# Patient Record
Sex: FEMALE | Race: WHITE | Employment: OTHER | URBAN - METROPOLITAN AREA
[De-identification: names, ages, dates, MRNs, and addresses within clinical notes are randomized per-mention and may not be internally consistent; named-entity substitution may affect disease eponyms.]

---

## 2017-03-09 RX ORDER — SIMVASTATIN 20 MG/1
TABLET, FILM COATED ORAL
Qty: 90 TAB | Refills: 2 | Status: SHIPPED | OUTPATIENT
Start: 2017-03-09 | End: 2017-03-21 | Stop reason: SDUPTHER

## 2017-03-09 RX ORDER — AMLODIPINE BESYLATE 5 MG/1
TABLET ORAL
Qty: 90 TAB | Refills: 0 | Status: SHIPPED | OUTPATIENT
Start: 2017-03-09 | End: 2017-03-21 | Stop reason: SDUPTHER

## 2017-03-09 RX ORDER — LISINOPRIL 40 MG/1
TABLET ORAL
Qty: 90 TAB | Refills: 0 | Status: SHIPPED | OUTPATIENT
Start: 2017-03-09 | End: 2017-03-21 | Stop reason: SDUPTHER

## 2017-03-17 ENCOUNTER — HOSPITAL ENCOUNTER (OUTPATIENT)
Dept: LAB | Age: 72
Discharge: HOME | End: 2017-03-17
Payer: MEDICARE

## 2017-03-17 ENCOUNTER — LAB ONLY (OUTPATIENT)
Dept: INTERNAL MEDICINE CLINIC | Age: 72
End: 2017-03-17

## 2017-03-17 DIAGNOSIS — I10 ESSENTIAL HYPERTENSION WITH GOAL BLOOD PRESSURE LESS THAN 130/80: ICD-10-CM

## 2017-03-17 DIAGNOSIS — I70.90 ARTERIOLOSCLEROSES: ICD-10-CM

## 2017-03-17 DIAGNOSIS — E78.00 PURE HYPERCHOLESTEROLEMIA: ICD-10-CM

## 2017-03-17 DIAGNOSIS — M54.31 SCIATICA OF RIGHT SIDE: ICD-10-CM

## 2017-03-17 DIAGNOSIS — K21.9 GASTROESOPHAGEAL REFLUX DISEASE WITHOUT ESOPHAGITIS: ICD-10-CM

## 2017-03-17 DIAGNOSIS — R60.0 LOCALIZED EDEMA: ICD-10-CM

## 2017-03-17 PROCEDURE — 85027 COMPLETE CBC AUTOMATED: CPT

## 2017-03-17 PROCEDURE — 36415 COLL VENOUS BLD VENIPUNCTURE: CPT

## 2017-03-17 PROCEDURE — 80048 BASIC METABOLIC PNL TOTAL CA: CPT

## 2017-03-18 LAB
BUN SERPL-MCNC: 12 MG/DL (ref 8–27)
BUN/CREAT SERPL: 19 (ref 11–26)
CALCIUM SERPL-MCNC: 9.3 MG/DL (ref 8.7–10.3)
CHLORIDE SERPL-SCNC: 100 MMOL/L (ref 96–106)
CO2 SERPL-SCNC: 25 MMOL/L (ref 18–29)
CREAT SERPL-MCNC: 0.64 MG/DL (ref 0.57–1)
ERYTHROCYTE [DISTWIDTH] IN BLOOD BY AUTOMATED COUNT: 13.3 % (ref 12.3–15.4)
GLUCOSE SERPL-MCNC: 91 MG/DL (ref 65–99)
HCT VFR BLD AUTO: 38.3 % (ref 34–46.6)
HGB BLD-MCNC: 12.3 G/DL (ref 11.1–15.9)
MCH RBC QN AUTO: 29.3 PG (ref 26.6–33)
MCHC RBC AUTO-ENTMCNC: 32.1 G/DL (ref 31.5–35.7)
MCV RBC AUTO: 91 FL (ref 79–97)
PLATELET # BLD AUTO: 185 X10E3/UL (ref 150–379)
POTASSIUM SERPL-SCNC: 4 MMOL/L (ref 3.5–5.2)
RBC # BLD AUTO: 4.2 X10E6/UL (ref 3.77–5.28)
SODIUM SERPL-SCNC: 142 MMOL/L (ref 134–144)
WBC # BLD AUTO: 4.1 X10E3/UL (ref 3.4–10.8)

## 2017-03-20 ENCOUNTER — DOCUMENTATION ONLY (OUTPATIENT)
Dept: INTERNAL MEDICINE CLINIC | Age: 72
End: 2017-03-20

## 2017-03-20 NOTE — PROGRESS NOTES
Medicare Part B Preventive Services Guidelines/Limitations Date last completed and Frequency Due Date   Bone Mass Measurement  (age 72 & older, biennial) Requires diagnosis related to osteoporosis or estrogen deficiency. Biennial benefit unless patient has history of long-term glucocorticoid tx or baseline is needed because initial test was by other method Completed 2/2016 Recommended every 2 years Due 2/2018   Cardiovascular Screening Blood Tests (every 5 years)  Total cholesterol, HDL, Triglycerides Order as a panel if possible Completed 9/2016  Recommended annually Due 9/2017   Colorectal Cancer Screening  -Fecal occult blood test (annual)  -Flexible sigmoidoscopy (5y)  -Screening colonoscopy (10y)  -Barium Enema   Completed 1/09/15 with Dr. Karis Muniz  Recommended every 5 years  Due 2020   Counseling to Prevent Tobacco Use (up to 8 sessions per year)  - Counseling greater than 3 and up to 10 minutes  - Counseling greater than 10 minutes Patients must be asymptomatic of tobacco-related conditions to receive as preventive service N/A N/A   Diabetes Screening Tests (at least every 3 years, Medicare covers annually or at 6-month intervals for prediabetic patients)     Fasting blood sugar (FBS) or glucose tolerance test (GTT) Patient must be diagnosed with one of the following:  -Hypertension, Dyslipidemia, obesity, previous impaired FBS or GTT  Or any two of the following: overweight, FH of diabetes, age ? 72, history of gestational diabetes, birth of baby weighing more than 9 pounds Completed: Glucose 91 in 9/16      Typically checked annually  Due 9/2017   Diabetes Self-Management Training (DSMT) (no USPSTF recommendation) Requires referral by treating physician for patient with diabetes or renal disease. 10 hours of initial DSMT session of no less than 30 minutes each in a continuous 12-month period. 2 hours of follow-up DSMT in subsequent years.  N/A N/A   Glaucoma Screening (no USPSTF recommendation) Diabetes mellitus, family history, , age 48 or over,  American, age 72 or over Completed 2015 with Dr. Boone Zamudio  Recommended annually Due now please call to schedule this visit    Human Immunodeficiency Virus (HIV) Screening (annually for increased risk patients)  HIV-1 and HIV-2 by EIA, DION, rapid antibody test, or oral mucosa transudate Patient must be at increased risk for HIV infection per USPSTF guidelines or pregnant. Tests covered annually for patients at increased risk. Pregnant patients may receive up to 3 test during pregnancy. N/A N/A   Medical Nutrition Therapy (MNT) (for diabetes or renal disease not recommended schedule) Requires referral by treating physician for patient with diabetes or renal disease. Can be provided in same year as diabetes self-management training (DSMT), and CMS recommends medical nutrition therapy take place after DSMT. Up to 3 hours for initial year and 2 hours in subsequent years. N/A N/A   Prostate Cancer Screening (annually up to age 76)  - Digital rectal exam (SAMIRA)  - Prostate specific antigen (PSA) Annually (age 48 or over), SAMIRA not paid separately when covered E/M service is provided on same date N/A N/A   Seasonal Influenza Vaccination (annually)   Completed 9/16/2016  Recommended Annually Due fall 2017   Pneumococcal Vaccination (once after 72)   Pneumococcal 23 - 2013  Recommended once over the age of 72     Prevnar 15 - Recommended once over the age of 65--completed today 3/17   Completed       Completed    You are due for a Prevnar 13 vaccine. You can get this at your local pharmacy. Hepatitis B Vaccinations (if medium/high risk) Medium/high risk factors: End-stage renal disease,  Hemophiliacs who received Factor VIII or IX concentrates, Clients of institutions for the mentally retarded, Persons who live in the same house as a HepB virus carrier, Homosexual men, Illicit injectable drug abusers. N/A N/A   Screening Mammography (biennial age 54-69)? Annually (age 36 or over) Completed 2/01/2016 and negative Due now   Screening Pap Tests and Pelvic Examination (up to age 79 and after 79 if unknown history or abnormal study last 10 years) Every 24 months except high risk Completed 1/15/16 with Dr. Magdy Arroyo  Due now or as directed by Dr. Cedrick Soria for Abdominal Aortic Aneurysm (AAA) (once) Patient must be referred through [facility] and not have had a screening for abdominal aortic aneurysm before under Medicare.  Limited to patients who meet one of the following criteria:  - Men who are 73-68 years old and have smoked more than 100 cigarettes in their lifetime.  -Anyone with a FH of AAA  -Anyone recommended for screening by USPSTF N/A N/A   Family Practice Management 2011

## 2017-03-21 ENCOUNTER — OFFICE VISIT (OUTPATIENT)
Dept: INTERNAL MEDICINE CLINIC | Age: 72
End: 2017-03-21

## 2017-03-21 VITALS
HEART RATE: 80 BPM | SYSTOLIC BLOOD PRESSURE: 103 MMHG | BODY MASS INDEX: 24.25 KG/M2 | TEMPERATURE: 98.5 F | DIASTOLIC BLOOD PRESSURE: 64 MMHG | WEIGHT: 173.2 LBS | OXYGEN SATURATION: 99 % | HEIGHT: 71 IN

## 2017-03-21 DIAGNOSIS — Z12.39 BREAST SCREENING: ICD-10-CM

## 2017-03-21 DIAGNOSIS — I10 ESSENTIAL HYPERTENSION: Primary | ICD-10-CM

## 2017-03-21 DIAGNOSIS — K21.9 GASTROESOPHAGEAL REFLUX DISEASE WITHOUT ESOPHAGITIS: ICD-10-CM

## 2017-03-21 DIAGNOSIS — I70.90 ARTERIOLOSCLEROSES: ICD-10-CM

## 2017-03-21 DIAGNOSIS — M19.072 PRIMARY OSTEOARTHRITIS OF BOTH FEET: ICD-10-CM

## 2017-03-21 DIAGNOSIS — E55.9 VITAMIN D DEFICIENCY: ICD-10-CM

## 2017-03-21 DIAGNOSIS — E78.00 PURE HYPERCHOLESTEROLEMIA: ICD-10-CM

## 2017-03-21 DIAGNOSIS — Z00.00 ROUTINE GENERAL MEDICAL EXAMINATION AT A HEALTH CARE FACILITY: ICD-10-CM

## 2017-03-21 DIAGNOSIS — M19.071 PRIMARY OSTEOARTHRITIS OF BOTH FEET: ICD-10-CM

## 2017-03-21 DIAGNOSIS — Z13.39 SCREENING FOR ALCOHOLISM: ICD-10-CM

## 2017-03-21 RX ORDER — LISINOPRIL 40 MG/1
TABLET ORAL
Qty: 90 TAB | Refills: 1 | Status: SHIPPED | OUTPATIENT
Start: 2017-03-21 | End: 2017-08-01 | Stop reason: SDUPTHER

## 2017-03-21 RX ORDER — NAPROXEN 500 MG/1
500 TABLET ORAL 2 TIMES DAILY WITH MEALS
Qty: 180 TAB | Refills: 2 | Status: SHIPPED | OUTPATIENT
Start: 2017-03-21

## 2017-03-21 RX ORDER — SIMVASTATIN 20 MG/1
TABLET, FILM COATED ORAL
Qty: 90 TAB | Refills: 2 | Status: SHIPPED | OUTPATIENT
Start: 2017-03-21

## 2017-03-21 RX ORDER — AMLODIPINE BESYLATE 5 MG/1
TABLET ORAL
Qty: 90 TAB | Refills: 1 | Status: SHIPPED | OUTPATIENT
Start: 2017-03-21 | End: 2017-08-01 | Stop reason: DRUGHIGH

## 2017-03-21 NOTE — PROGRESS NOTES
HISTORY OF PRESENT ILLNESS  Mariah Neumann is a 73 y.o. female. HPI   Last here 9/16/16.  Pt is here to f/u on chronic conditions    BP today is 103/64  Denies orthostasis  Continues norvasc 5mg and lisinopril 40mg daily     Reviewed last labs 3/17  Kidney nl, liver nl, blood counts nl, fasting BS nl    Wt is stable since last visit  Her weight is within normal ranges    Continues nexium 40mg daily for reflux, works well, no breakthrough  She has some breakthrough occasionally and takes TUMS for this   She takes TUMs multiple times per day d/t sx- about twice daily   Discussed GI evaluation for this as she should not be having persistent sx despite nexium   She will likely wait until she moves to [place] and complete this evaluation after this    Pt follows with Dr. Alessio Hightower (cardio)   Last saw him 7/16   Will only f/u prn     Since last visit, pt saw Dr. Rudi Power (podiatry)   She had cortisone injection to her foot for a cyst   She also took naprosyn from 2/17 to 3/17 to improve pain  Pt states this has been improving  Pt has been wearing sandals to avoid pressure to her cyst with footwear    Continues zocor 20m daily for cholesterol- at goal in September     Pt states she will be moving to [place]   She will be moving this summer       PREVENTIVE:  Colonoscopy: 1/12/15, Dr. Daija Scherer, repeat in 5 years  Pap: Dr. Sidra Tee, 1/15/16, every other year  Mammogram: 2/16 negative, due, ordered, will schedule  Dexa: 2/16 mild osteopenia  Tdap: 8/10/2008  Pneumovax: 11/21/2013  [id number]: 11/10/2016  Zostavax: 12/17/2008  Flu shot: 9/16/2016  Eye exam: Dr. Saundra Beard, 5/29/15, she reports of repeat, will get notes  Lipids: 9/16 LDL 96        Patient Active Problem List    Diagnosis Date Noted    Arterioloscleroses 07/12/2016    ACP (advance care planning) 03/15/2016    Sciatica 12/04/2015    Vitamin D deficiency 10/24/2014    GERD (gastroesophageal reflux disease) 05/14/2013    HTN (hypertension) 11/21/2011    Hyperlipidemia 11/21/2011     Current Outpatient Prescriptions   Medication Sig Dispense Refill    amLODIPine (NORVASC) 5 mg tablet Take 1 tablet by mouth  daily 90 Tab 0    simvastatin (ZOCOR) 20 mg tablet Take 1 tablet by mouth  nightly 90 Tab 2    lisinopril (PRINIVIL, ZESTRIL) 40 mg tablet Take 1 tablet by mouth  daily 90 Tab 0    naproxen (NAPROSYN) 500 mg tablet Take 1 Tab by mouth two (2) times daily (with meals). 60 Tab 2    Magnesium Oxide 500 mg cap Take 500 mg by mouth daily.  esomeprazole (NEXIUM) 40 mg capsule Take 1 Cap by mouth daily. 30 Cap 6    acetaminophen (TYLENOL EXTRA STRENGTH) 500 mg tablet Take 500 mg by mouth nightly.  GLUCOSAMINE HCL/CHONDR CARDONA A NA (OSTEO BI-FLEX PO) Take  by mouth. Takes one po daily.  Cholecalciferol, Vitamin D3, (VITAMIN D3) 1,000 unit cap Take 1,000 Units by mouth daily.  BLACK COHOSH ROOT (REMIFEMIN MENOPAUSE PO) Take  by mouth. Takes one po daily. Sometimes takes a second dose at night.  CALCIUM CARBONATE (TUMS PO) Take 500 mg by mouth as needed.  omega-3 fatty acids-vitamin e (FISH OIL) 1,000 mg cap Take 1 capsule by mouth daily.  MULTIVITAMIN PO Take  by mouth. Takes one po daily.  Calcium-Cholecalciferol, D3, (CALTRATE 600 + D) 600 mg(1,500mg) -400 unit Chew Take  by mouth.  aspirin delayed-release 81 mg tablet Take 81 mg by mouth nightly.  triamcinolone acetonide (KENALOG) 0.1 % topical cream Apply  to affected area two (2) times a day. use thin layer 30 g 2     Past Surgical History:   Procedure Laterality Date    ENDOSCOPY, COLON, DIAGNOSTIC  12/2009    ([facility])-f/u 5 yrs.     HX BUNIONECTOMY  1998    left    HX CHOLECYSTECTOMY  3/10    HX GYN      not sure what was done    HX HEENT Left 1990    retinal tear repair    HX OTHER SURGICAL      EGD      Lab Results  Component Value Date/Time   WBC 4.1 03/17/2017 08:18 AM   HGB 12.3 03/17/2017 08:18 AM   HCT 38.3 03/17/2017 08:18 AM   PLATELET 938 03/17/2017 08:18 AM   MCV 91 03/17/2017 08:18 AM       Lab Results  Component Value Date/Time   Cholesterol, total 189 09/15/2016 08:33 AM   HDL Cholesterol 80 09/15/2016 08:33 AM   LDL, calculated 96 09/15/2016 08:33 AM   Triglyceride 63 09/15/2016 08:33 AM       Lab Results  Component Value Date/Time   GFR est  03/17/2017 08:18 AM   GFR est non-AA 91 03/17/2017 08:18 AM   Creatinine 0.64 03/17/2017 08:18 AM   BUN 12 03/17/2017 08:18 AM   Sodium 142 03/17/2017 08:18 AM   Potassium 4.0 03/17/2017 08:18 AM   Chloride 100 03/17/2017 08:18 AM   CO2 25 03/17/2017 08:18 AM         Review of Systems   Respiratory: Negative for shortness of breath. Cardiovascular: Negative for chest pain. Physical Exam   Constitutional: She is oriented to person, place, and time. She appears well-developed and well-nourished. No distress. HENT:   Head: Normocephalic and atraumatic. Mouth/Throat: Oropharynx is clear and moist. No oropharyngeal exudate. Eyes: Conjunctivae and EOM are normal. Right eye exhibits no discharge. Left eye exhibits no discharge. Neck: Normal range of motion. Neck supple. Cardiovascular: Normal rate, regular rhythm and normal heart sounds. Exam reveals no gallop and no friction rub. No murmur heard. Pulmonary/Chest: Effort normal and breath sounds normal. No respiratory distress. She has no wheezes. She has no rales. She exhibits no tenderness. Musculoskeletal: She exhibits no edema, tenderness or deformity. Lymphadenopathy:     She has no cervical adenopathy. Neurological: She is alert and oriented to person, place, and time. Coordination normal.   Skin: Skin is warm and dry. No rash noted. She is not diaphoretic. No erythema. No pallor. Psychiatric: She has a normal mood and affect. Her behavior is normal.       ASSESSMENT and PLAN    ICD-10-CM ICD-9-CM    1. Essential hypertension    BP well controlled on lisinopril and HCTZ, continue, no change to dose today.  I10 401.9 LIPID PANEL      METABOLIC PANEL, COMPREHENSIVE      TSH 3RD GENERATION      VITAMIN D, 25 HYDROXY   2. Routine general medical examination at a health care facility Z00.00 V70.0 LIPID PANEL      METABOLIC PANEL, COMPREHENSIVE      TSH 3RD GENERATION      VITAMIN D, 25 HYDROXY   3. Screening for alcoholism    Screen    Z13.89 V79.1 LIPID PANEL      METABOLIC PANEL, COMPREHENSIVE      TSH 3RD GENERATION      VITAMIN D, 25 HYDROXY   4. Pure hypercholesterolemia    At goal on zocor in September, repeat lipids with next blood draw. E78.00 272.0 LIPID PANEL      METABOLIC PANEL, COMPREHENSIVE      TSH 3RD GENERATION      VITAMIN D, 25 HYDROXY   5. Breast screening    Mammogram due and ordered Z12.39 V76.10 [facility] MAMMO BI SCREENING INCL CAD      LIPID PANEL      METABOLIC PANEL, COMPREHENSIVE      TSH 3RD GENERATION      VITAMIN D, 25 HYDROXY   6. Gastroesophageal reflux disease without esophagitis    Takes nexium daily but continues to have breakthrough reflux on daily basis, takes multiple TUMS every single day, this has been going on for quite some time, discussed her sx should not be so severe, discussed this requires further evaluation with EGD, she is amenable to this, however, she is moving to [place] and would like to wait until she moves to set this up. K21.9 530.81 LIPID PANEL      METABOLIC PANEL, COMPREHENSIVE      TSH 3RD GENERATION      VITAMIN D, 25 HYDROXY   7. Vitamin D deficiency    Taking OTC vit D, check level and treat as needed E55.9 268.9 LIPID PANEL      METABOLIC PANEL, COMPREHENSIVE      TSH 3RD GENERATION      VITAMIN D, 25 HYDROXY   8. Arterioloscleroses    Was seen by Dr. Beckie Main last year for this, stress test was unrevealing, arterioloscelosis was found on imaging incidentally which is what provoked evaluation, will f/u with cardiology as needed. Focus on risk factor modification, continue daily statin.    I70.90 440.9     0a--will give naprosyn to tx, works well, discussed this could aggravate gerd sx. Depression screen reviewed and negative    Written by Iram Godoy, as dictated by Anmol Cochran MD.    Current diagnosis and concerns discussed with pt at length. Understands risks and benefits or current treatment plan and medications and accepts the treatment and medication with any possible risks.   Pt asks appropriate questions which were answered.   Pt instructed to call with any concerns or problems. This note will not be viewable in [address]

## 2017-03-21 NOTE — PATIENT INSTRUCTIONS
Medicare Part B Preventive Services Guidelines/Limitations Date last completed and Frequency Due Date   Bone Mass Measurement  (age 72 & older, biennial) Requires diagnosis related to osteoporosis or estrogen deficiency. Biennial benefit unless patient has history of long-term glucocorticoid tx or baseline is needed because initial test was by other method Completed 2/2016 Recommended every 2 years Due 2/2018   Cardiovascular Screening Blood Tests (every 5 years)  Total cholesterol, HDL, Triglycerides Order as a panel if possible Completed 9/2016  Recommended annually Due 9/2017   Colorectal Cancer Screening  -Fecal occult blood test (annual)  -Flexible sigmoidoscopy (5y)  -Screening colonoscopy (10y)  -Barium Enema    Completed 1/09/15 with Dr. Nani Cotto  Recommended every 5 years  Due 2020   Counseling to Prevent Tobacco Use (up to 8 sessions per year)  - Counseling greater than 3 and up to 10 minutes  - Counseling greater than 10 minutes Patients must be asymptomatic of tobacco-related conditions to receive as preventive service N/A N/A   Diabetes Screening Tests (at least every 3 years, Medicare covers annually or at 6-month intervals for prediabetic patients)      Fasting blood sugar (FBS) or glucose tolerance test (GTT) Patient must be diagnosed with one of the following:  -Hypertension, Dyslipidemia, obesity, previous impaired FBS or GTT  Or any two of the following: overweight, FH of diabetes, age ? 72, history of gestational diabetes, birth of baby weighing more than 9 pounds Completed: Glucose normal in 3/17      Typically checked annually Due 8/2017   Diabetes Self-Management Training (DSMT) (no USPSTF recommendation) Requires referral by treating physician for patient with diabetes or renal disease. 10 hours of initial DSMT session of no less than 30 minutes each in a continuous 12-month period. 2 hours of follow-up DSMT in subsequent years.  N/A N/A   Glaucoma Screening (no USPSTF recommendation) Diabetes mellitus, family history, , age 48 or over,  American, age 72 or over Completed 2016 with Dr. Armando Meyer  Recommended annually Due now please call to schedule this visit    Human Immunodeficiency Virus (HIV) Screening (annually for increased risk patients)  HIV-1 and HIV-2 by EIA, DION, rapid antibody test, or oral mucosa transudate Patient must be at increased risk for HIV infection per USPSTF guidelines or pregnant. Tests covered annually for patients at increased risk. Pregnant patients may receive up to 3 test during pregnancy. N/A N/A   Medical Nutrition Therapy (MNT) (for diabetes or renal disease not recommended schedule) Requires referral by treating physician for patient with diabetes or renal disease. Can be provided in same year as diabetes self-management training (DSMT), and CMS recommends medical nutrition therapy take place after DSMT. Up to 3 hours for initial year and 2 hours in subsequent years. N/A N/A   Prostate Cancer Screening (annually up to age 76)  - Digital rectal exam (SAMIRA)  - Prostate specific antigen (PSA) Annually (age 48 or over), SAMIRA not paid separately when covered E/M service is provided on same date N/A N/A   Seasonal Influenza Vaccination (annually)    Completed 9/16/2016  Recommended Annually Due fall 2017   Pneumococcal Vaccination (once after 72)    Pneumococcal 23 - 2013  Recommended once over the age of 72  [de-identified] 15 - Recommended once 11/16    Completed         Completed        Hepatitis B Vaccinations (if medium/high risk) Medium/high risk factors: End-stage renal disease,  Hemophiliacs who received Factor VIII or IX concentrates, Clients of institutions for the mentally retarded, Persons who live in the same house as a HepB virus carrier, Homosexual men, Illicit injectable drug abusers. N/A N/A   Screening Mammography (biennial age 54-69)?  Annually (age 36 or over) Completed 2/01/2016 and negative Due now   Screening Pap Tests and Pelvic Examination (up to age 79 and after 79 if unknown history or abnormal study last 10 years) Every 24 months except high risk Completed 1/15/16 with Dr. Shelbie Chisholm  Due now or as directed by Dr. Caroline Gee for Abdominal Aortic Aneurysm (AAA) (once) Patient must be referred through [facility] and not have had a screening for abdominal aortic aneurysm before under Medicare.  Limited to patients who meet one of the following criteria:  - Men who are 73-68 years old and have smoked more than 100 cigarettes in their lifetime.  -Anyone with a FH of AAA  -Anyone recommended for screening by USPSTF N/A N/A   Family Practice Management 2011

## 2017-03-21 NOTE — PROGRESS NOTES
This is a Subsequent Medicare Annual Wellness Visit providing Personalized Prevention Plan Services (PPPS) (Performed 12 months after initial AWV and PPPS )    I have reviewed the patient's medical history in detail and updated the computerized patient record. History     Past Medical History:   Diagnosis Date    Allergic rhinitis     Arthritis     right knee/left hand/right foot    Diverticulosis     Female bladder prolapse     mild    GERD (gastroesophageal reflux disease)     Hypercholesterolemia     Hypertension     Ill-defined condition     right foot plantar fasciitis    Osteoarthritis of right knee     Osteopenia       Past Surgical History:   Procedure Laterality Date    ENDOSCOPY, COLON, DIAGNOSTIC  12/2009    ([facility])-f/u 5 yrs.  HX BUNIONECTOMY  1998    left    HX CHOLECYSTECTOMY  3/10    HX GYN      not sure what was done    HX HEENT Left 1990    retinal tear repair    HX OTHER SURGICAL      EGD     Current Outpatient Prescriptions   Medication Sig Dispense Refill    amLODIPine (NORVASC) 5 mg tablet Take 1 tablet by mouth  daily 90 Tab 0    simvastatin (ZOCOR) 20 mg tablet Take 1 tablet by mouth  nightly 90 Tab 2    lisinopril (PRINIVIL, ZESTRIL) 40 mg tablet Take 1 tablet by mouth  daily 90 Tab 0    naproxen (NAPROSYN) 500 mg tablet Take 1 Tab by mouth two (2) times daily (with meals). 60 Tab 2    Magnesium Oxide 500 mg cap Take 500 mg by mouth daily.  esomeprazole (NEXIUM) 40 mg capsule Take 1 Cap by mouth daily. 30 Cap 6    acetaminophen (TYLENOL EXTRA STRENGTH) 500 mg tablet Take 500 mg by mouth nightly.  GLUCOSAMINE HCL/CHONDR CARDONA A NA (OSTEO BI-FLEX PO) Take  by mouth. Takes one po daily.  Cholecalciferol, Vitamin D3, (VITAMIN D3) 1,000 unit cap Take 1,000 Units by mouth daily.  BLACK COHOSH ROOT (REMIFEMIN MENOPAUSE PO) Take  by mouth. Takes one po daily. Sometimes takes a second dose at night.       CALCIUM CARBONATE (TUMS PO) Take 500 mg by mouth as needed.  omega-3 fatty acids-vitamin e (FISH OIL) 1,000 mg cap Take 1 capsule by mouth daily.  MULTIVITAMIN PO Take  by mouth. Takes one po daily.  Calcium-Cholecalciferol, D3, (CALTRATE 600 + D) 600 mg(1,500mg) -400 unit Chew Take  by mouth.  aspirin delayed-release 81 mg tablet Take 81 mg by mouth nightly.  triamcinolone acetonide (KENALOG) 0.1 % topical cream Apply  to affected area two (2) times a day. use thin layer 30 g 2     Allergies   Allergen Reactions    Codeine Nausea and Vomiting     Vomiting    Tramadol Nausea Only     Family History   Problem Relation Age of Onset    Cancer Mother      colon with liver mets    Heart Disease Father 54     MIx2    COPD Father     Cancer Brother      squamous cell skin ca    Cancer Maternal Grandmother      ? where    Other Paternal Grandmother      arteriosclerosis    Cancer Paternal Grandfather      ? where     Social History   Substance Use Topics    Smoking status: Never Smoker    Smokeless tobacco: Never Used    Alcohol use No     Patient Active Problem List   Diagnosis Code    HTN (hypertension) I10    Hyperlipidemia E78.5    GERD (gastroesophageal reflux disease) K21.9    Vitamin D deficiency E55.9    Sciatica M54.30    ACP (advance care planning) Z71.89    Arterioloscleroses I70.90       Depression Risk Factor Screening:     PHQ 2 / 9, over the last two weeks 3/21/2017   Little interest or pleasure in doing things Not at all   Feeling down, depressed or hopeless Not at all   Total Score PHQ 2 0   none  Alcohol Risk Factor Screening: On any occasion during the past 3 months, have you had more than 3 drinks containing alcohol? No    Do you average more than 7 drinks per week? No  No alcohol     Functional Ability and Level of Safety:     Hearing Loss   normal-to-mild    Activities of Daily Living   Self-care.    Requires assistance with: no ADLs    Fall Risk     Fall Risk Assessment, last 12 mths 3/21/2017   Able to walk? Yes   Fall in past 12 months? No   Fall with injury? -   Number of falls in past 12 months -   Fall Risk Score -   no recent falls since 2015  Abuse Screen   Patient is not abused  Lives with   Review of Systems   Not required    Physical Examination     Evaluation of Cognitive Function:  Mood/affect:  neutral  Appearance: age appropriate  Family member/caregiver input: none    No exam performed today, awv. Patient Care Team:  Hussein Hu MD as PCP - General (Internal Medicine)  Mitul Villa MD as Physician (Ophthalmology)  Puneet Valdes MD as Physician (Orthopedic Surgery)  Madai Lyn, RN as Nurse Anny Matute MD (Obstetrics & Gynecology)  Madai Lyn RN as Nurse Navigator  Dionne Coleman MD (Gastroenterology)  Letty Ramos MD (Dermatology)  Karon Vera MD (Cardiology)  Reggie Walters DPM (Podiatry)    updated    Advice/Referrals/Counseling   Education and counseling provided:  Are appropriate based on today's review and evaluation  End-of-Life planning (with patient's consent)  Screening Mammography  Screening for glaucoma  Diabetes screening test      Assessment/Plan       ICD-10-CM ICD-9-CM    1. Routine general medical examination at a health care facility Z00.00 V70.0    2. Screening for alcoholism Z13.89 V79.1    . Discussed with patient about advance medical directive. Provided patient blank AMD and Your Right to Decide Booklet. Requested that if completed to provide a copy of AMD to office.    Her  is sdm        Colonoscopy: 1/12/15, Dr. Ashanti Gilmore, repeat in 5 years    Pap: Dr. Gilles Rashid, 1/15/16, every other year due 1/18  Mammogram: 2/16 negative, due, ordered, will schedule  Dexa: 2/16 mild osteopenia repeat 2/18    Tdap: 8/10/2008  Pneumovax: 11/21/2013  [id number]: 11/10/2016  Zostavax: 12/17/2008  Flu shot: 9/16/2016    Eye exam: Dr. Cate Elias, 5/29/15, she reports of repeat, will get notes    Lipids: 9/16 LDL 9--annual6    Medication reconciliation completed by MA and reviewed by me. Medical/surgical/social/family history reviewed and updated by me. Patient provided AVS and preventative screening table. Patient verbalized understanding of all information discussed.

## 2017-03-21 NOTE — MR AVS SNAPSHOT
Visit Information Date & Time Provider Department Dept. Phone Encounter #  
 3/21/2017  1:30 PM Donnie Bryant, [address] [place] [address],[address] [phone] [id number] Follow-up Instructions Return in about 5 months (around 8/7/2017). Upcoming Health Maintenance Date Due  
 MEDICARE YEARLY EXAM 3/16/2017 GLAUCOMA SCREENING Q2Y 5/29/2017 BREAST CANCER SCRN MAMMOGRAM 2/1/2018 DTaP/Tdap/Td series (2 - Td) 8/10/2018 Pneumococcal 65+ Low/Medium Risk (2 of 2 - PPSV23) 11/21/2018 COLONOSCOPY 1/12/2020 Allergies as of 3/21/2017  Review Complete On: 3/21/2017 By: Donnie Bryant MD  
  
 Severity Noted Reaction Type Reactions Codeine  11/21/2011    Nausea and Vomiting Vomiting Tramadol  03/21/2017    Nausea Only Current Immunizations  Reviewed on 3/21/2017 Name Date Influenza Vaccine 10/1/2015, 10/24/2014, 10/25/2013 Influenza Vaccine (Quad) PF 9/16/2016 Influenza Vaccine Split 11/28/2012, 11/21/2011  8:57 AM  
 Pneumococcal Conjugate (PCV-13) 11/10/2016 Pneumococcal Vaccine (Unspecified Type) 11/21/2013 TDAP Vaccine 8/10/2008 Zoster 12/17/2008 Reviewed by Donnie Bryant MD on 3/21/2017 at  1:59 PM  
You Were Diagnosed With   
  
 Codes Comments Essential hypertension    -  Primary ICD-10-CM: I10 
ICD-9-CM: 401.9 Routine general medical examination at a health care facility     ICD-10-CM: Z00.00 ICD-9-CM: V70.0 Screening for alcoholism     ICD-10-CM: Z13.89 ICD-9-CM: V79.1 Pure hypercholesterolemia     ICD-10-CM: E78.00 ICD-9-CM: 272.0 Breast screening     ICD-10-CM: Z12.39 
ICD-9-CM: V76.10 Gastroesophageal reflux disease without esophagitis     ICD-10-CM: K21.9 ICD-9-CM: 530.81 Vitamin D deficiency     ICD-10-CM: E55.9 ICD-9-CM: 268.9 Arterioloscleroses     ICD-10-CM: I70.90 ICD-9-CM: 440.9 Vitals BP Pulse Temp Height(growth percentile) Weight(growth percentile) SpO2 103/64 (BP 1 Location: Left arm, BP Patient Position: Sitting) 80 98.5 °F (36.9 °C) (Oral) 5' 11\" (1.803 m) 173 lb 3.2 oz (78.6 kg) 99% BMI OB Status Smoking Status 24.16 kg/m2 Postmenopausal Never Smoker BMI and BSA Data Body Mass Index Body Surface Area  
 24.16 kg/m 2 1.98 m 2 Preferred Pharmacy Pharmacy Name Phone 305 [facility], [address] [place] [address] [address] [place] Your Updated Medication List  
  
   
This list is accurate as of: 3/21/17  2:10 PM.  Always use your most recent med list. amLODIPine 5 mg tablet Commonly known as:  Cedillo Del Take 1 tablet by mouth  daily  
  
 aspirin delayed-release 81 mg tablet Take 81 mg by mouth nightly. CALTRATE 600 + D 600 mg(1,500mg) -400 unit Chew Generic drug:  Calcium-Cholecalciferol (D3) Take  by mouth.  
  
 esomeprazole 40 mg capsule Commonly known as:  NexIUM Take 1 Cap by mouth daily. FISH OIL 1,000 mg Cap Generic drug:  omega-3 fatty acids-vitamin e Take 1 capsule by mouth daily. lisinopril 40 mg tablet Commonly known as:  Jessica Creede Take 1 tablet by mouth  daily Magnesium Oxide 500 mg Cap Take 500 mg by mouth daily. MULTIVITAMIN PO Take  by mouth. Takes one po daily. naproxen 500 mg tablet Commonly known as:  NAPROSYN Take 1 Tab by mouth two (2) times daily (with meals). OSTEO BI-FLEX PO Take  by mouth. Takes one po daily. REMIFEMIN MENOPAUSE PO Take  by mouth. Takes one po daily. Sometimes takes a second dose at night. simvastatin 20 mg tablet Commonly known as:  ZOCOR Take 1 tablet by mouth  nightly  
  
 triamcinolone acetonide 0.1 % topical cream  
Commonly known as:  KENALOG Apply  to affected area two (2) times a day. use thin layer TUMS PO Take 500 mg by mouth as needed. TYLENOL EXTRA STRENGTH 500 mg tablet Generic drug:  acetaminophen Take 500 mg by mouth nightly. VITAMIN D3 1,000 unit Cap Generic drug:  cholecalciferol Take 1,000 Units by mouth daily. Follow-up Instructions Return in about 5 months (around 8/7/2017). To-Do List   
 03/21/2017 Imaging:  [facility] BI SCREENING INCL CAD   
  
 03/22/2017 Lab:  LIPID PANEL   
  
 03/22/2017 Lab:  METABOLIC PANEL, COMPREHENSIVE   
  
 03/22/2017 Lab:  TSH 3RD GENERATION   
  
 03/22/2017 Lab:  VITAMIN D, 25 HYDROXY Patient Instructions Medicare Part B Preventive Services Guidelines/Limitations Date last completed and Frequency Due Date Bone Mass Measurement 
(age 72 & older, biennial) Requires diagnosis related to osteoporosis or estrogen deficiency. Biennial benefit unless patient has history of long-term glucocorticoid tx or baseline is needed because initial test was by other method Completed 2/2016 Recommended every 2 years Due 2/2018 Cardiovascular Screening Blood Tests (every 5 years) Total cholesterol, HDL, Triglycerides Order as a panel if possible Completed 9/2016 Recommended annually Due 9/2017 Colorectal Cancer Screening 
-Fecal occult blood test (annual) -Flexible sigmoidoscopy (5y) 
-Screening colonoscopy (10y) -Barium Enema    Completed 1/09/15 with Dr. Guillermo Dior Recommended every 5 years  Due 2020 Counseling to Prevent Tobacco Use (up to 8 sessions per year) - Counseling greater than 3 and up to 10 minutes - Counseling greater than 10 minutes Patients must be asymptomatic of tobacco-related conditions to receive as preventive service N/A N/A Diabetes Screening Tests (at least every 3 years, Medicare covers annually or at 6-month intervals for prediabetic patients) 
   
Fasting blood sugar (FBS) or glucose tolerance test (GTT) Patient must be diagnosed with one of the following: 
-Hypertension, Dyslipidemia, obesity, previous impaired FBS or GTT Or any two of the following: overweight, FH of diabetes, age ? 72, history of gestational diabetes, birth of baby weighing more than 9 pounds Completed: Glucose normal in 3/17 
   
Typically checked annually Due 8/2017 Diabetes Self-Management Training (DSMT) (no USPSTF recommendation) Requires referral by treating physician for patient with diabetes or renal disease. 10 hours of initial DSMT session of no less than 30 minutes each in a continuous 12-month period. 2 hours of follow-up DSMT in subsequent years. N/A N/A Glaucoma Screening (no USPSTF recommendation) Diabetes mellitus, family history, , age 48 or over,  American, age 72 or over Completed 2016 with Dr. Dejon Mondragon Recommended annually Due now please call to schedule this visit Human Immunodeficiency Virus (HIV) Screening (annually for increased risk patients) HIV-1 and HIV-2 by EIA, DION, rapid antibody test, or oral mucosa transudate Patient must be at increased risk for HIV infection per USPSTF guidelines or pregnant. Tests covered annually for patients at increased risk. Pregnant patients may receive up to 3 test during pregnancy. N/A N/A Medical Nutrition Therapy (MNT) (for diabetes or renal disease not recommended schedule) Requires referral by treating physician for patient with diabetes or renal disease. Can be provided in same year as diabetes self-management training (DSMT), and CMS recommends medical nutrition therapy take place after DSMT. Up to 3 hours for initial year and 2 hours in subsequent years. N/A N/A Prostate Cancer Screening (annually up to age 76) - Digital rectal exam (SAMIRA) - Prostate specific antigen (PSA) Annually (age 48 or over), SAMIRA not paid separately when covered E/M service is provided on same date N/A N/A Seasonal Influenza Vaccination (annually)    Completed 9/16/2016 Recommended Annually Due fall 2017 Pneumococcal Vaccination (once after 65)    Pneumococcal 23 - 2013 Recommended once over the age of 72 
   
Prevnar 15 - Recommended once 11/16   
Completed  
  
  
Completed 
  
  
Hepatitis B Vaccinations (if medium/high risk) Medium/high risk factors: End-stage renal disease, Hemophiliacs who received Factor VIII or IX concentrates, Clients of institutions for the mentally retarded, Persons who live in the same house as a HepB virus carrier, Homosexual men, Illicit injectable drug abusers. N/A N/A Screening Mammography (biennial age 54-69)? Annually (age 36 or over) Completed 2/01/2016 and negative Due now Screening Pap Tests and Pelvic Examination (up to age 79 and after 79 if unknown history or abnormal study last 10 years) Every 24 months except high risk Completed 1/15/16 with Dr. Ila Enamorado  Due now or as directed by Dr. Ila Enamorado Ultrasound Screening for Abdominal Aortic Aneurysm (AAA) (once) Patient must be referred through IPPE and not have had a screening for abdominal aortic aneurysm before under Medicare. Limited to patients who meet one of the following criteria: 
- Men who are 73-68 years old and have smoked more than 100 cigarettes in their lifetime. 
-Anyone with a FH of AAA 
-Anyone recommended for screening by USPSTF N/A N/A Family Practice Management 2011 
  
 
 
  
Introducing [facility]! Dear Sharad Booth: 
Thank you for requesting a PerceptiMed account. Our records indicate that you already have an active PerceptiMed account. You can access your account anytime at [email or web address] Did you know that you can access your hospital and ER discharge instructions at any time in PerceptiMed? You can also review all of your test results from your hospital stay or ER visit. Additional Information If you have questions, please visit the Frequently Asked Questions section of the PerceptiMed website at [email or web address] Remember, PerceptiMed is NOT to be used for urgent needs. For medical emergencies, dial 911. Now available from your iPhone and Android! Please provide this summary of care documentation to your next provider. Your primary care clinician is listed as Jennifer Bullock. If you have any questions after today's visit, please call [phone].

## 2017-04-07 ENCOUNTER — HOSPITAL ENCOUNTER (OUTPATIENT)
Dept: MAMMOGRAPHY | Age: 72
Discharge: HOME | End: 2017-04-07
Attending: INTERNAL MEDICINE
Payer: MEDICARE

## 2017-04-07 DIAGNOSIS — Z12.39 BREAST SCREENING: ICD-10-CM

## 2017-04-07 PROCEDURE — 77067 SCR MAMMO BI INCL CAD: CPT

## 2017-08-01 ENCOUNTER — OFFICE VISIT (OUTPATIENT)
Dept: INTERNAL MEDICINE CLINIC | Age: 72
End: 2017-08-01

## 2017-08-01 ENCOUNTER — HOSPITAL ENCOUNTER (OUTPATIENT)
Dept: LAB | Age: 72
Discharge: HOME | End: 2017-08-01
Payer: MEDICARE

## 2017-08-01 VITALS
RESPIRATION RATE: 16 BRPM | HEART RATE: 68 BPM | DIASTOLIC BLOOD PRESSURE: 63 MMHG | WEIGHT: 165 LBS | BODY MASS INDEX: 23.1 KG/M2 | TEMPERATURE: 97.2 F | SYSTOLIC BLOOD PRESSURE: 99 MMHG | HEIGHT: 71 IN | OXYGEN SATURATION: 97 %

## 2017-08-01 DIAGNOSIS — K21.9 GASTROESOPHAGEAL REFLUX DISEASE WITHOUT ESOPHAGITIS: ICD-10-CM

## 2017-08-01 DIAGNOSIS — Z12.39 BREAST SCREENING: ICD-10-CM

## 2017-08-01 DIAGNOSIS — Z00.00 ROUTINE GENERAL MEDICAL EXAMINATION AT A HEALTH CARE FACILITY: ICD-10-CM

## 2017-08-01 DIAGNOSIS — Z13.39 SCREENING FOR ALCOHOLISM: ICD-10-CM

## 2017-08-01 DIAGNOSIS — E78.00 PURE HYPERCHOLESTEROLEMIA: ICD-10-CM

## 2017-08-01 DIAGNOSIS — E55.9 VITAMIN D DEFICIENCY: ICD-10-CM

## 2017-08-01 DIAGNOSIS — I10 ESSENTIAL HYPERTENSION: Primary | ICD-10-CM

## 2017-08-01 PROCEDURE — 82306 VITAMIN D 25 HYDROXY: CPT

## 2017-08-01 PROCEDURE — 36415 COLL VENOUS BLD VENIPUNCTURE: CPT

## 2017-08-01 PROCEDURE — 80061 LIPID PANEL: CPT

## 2017-08-01 PROCEDURE — 80053 COMPREHEN METABOLIC PANEL: CPT

## 2017-08-01 PROCEDURE — 84443 ASSAY THYROID STIM HORMONE: CPT

## 2017-08-01 RX ORDER — AMLODIPINE BESYLATE 5 MG/1
TABLET ORAL
Qty: 90 TAB | Refills: 1 | Status: CANCELLED | OUTPATIENT
Start: 2017-08-01

## 2017-08-01 RX ORDER — AMLODIPINE BESYLATE 2.5 MG/1
2.5 TABLET ORAL DAILY
Qty: 90 TAB | Refills: 2 | Status: SHIPPED | OUTPATIENT
Start: 2017-08-01

## 2017-08-01 RX ORDER — LISINOPRIL 40 MG/1
TABLET ORAL
Qty: 90 TAB | Refills: 2 | Status: SHIPPED | OUTPATIENT
Start: 2017-08-01

## 2017-08-01 NOTE — MR AVS SNAPSHOT
Visit Information Date & Time Provider Department Dept. Phone Encounter #  
 8/1/2017  9:15 AM Fredy Reddy, [address] [phone] Follow-up Instructions Return if symptoms worsen or fail to improve. Upcoming Health Maintenance Date Due  
 GLAUCOMA SCREENING Q2Y 5/29/2017 INFLUENZA AGE 9 TO ADULT 8/1/2017 MEDICARE YEARLY EXAM 3/22/2018 DTaP/Tdap/Td series (2 - Td) 8/10/2018 BREAST CANCER SCRN MAMMOGRAM 4/7/2019 COLONOSCOPY 1/12/2020 Allergies as of 8/1/2017  Review Complete On: 8/1/2017 By: Fredy Reddy MD  
  
 Severity Noted Reaction Type Reactions Codeine  11/21/2011    Nausea and Vomiting Vomiting Tramadol  03/21/2017    Nausea Only Current Immunizations  Reviewed on 3/21/2017 Name Date Influenza Vaccine 10/1/2015, 10/24/2014, 10/25/2013 Influenza Vaccine (Quad) PF 9/16/2016 Influenza Vaccine Split 11/28/2012, 11/21/2011  8:57 AM  
 Pneumococcal Conjugate (PCV-13) 11/10/2016 Pneumococcal Vaccine (Unspecified Type) 11/21/2013 TDAP Vaccine 8/10/2008 Zoster 12/17/2008 Not reviewed this visit You Were Diagnosed With   
  
 Codes Comments Essential hypertension    -  Primary ICD-10-CM: I10 
ICD-9-CM: 401.9 Pure hypercholesterolemia     ICD-10-CM: E78.00 ICD-9-CM: 272.0 Gastroesophageal reflux disease without esophagitis     ICD-10-CM: K21.9 ICD-9-CM: 530.81 Vitals BP Pulse Temp Resp Height(growth percentile) Weight(growth percentile) 99/63 (BP 1 Location: Left arm, BP Patient Position: Sitting) 68 97.2 °F (36.2 °C) (Oral) 16 5' 11\" (1.803 m) 165 lb (74.8 kg) SpO2 BMI OB Status Smoking Status 97% 23.01 kg/m2 Postmenopausal Never Smoker Vitals History BMI and BSA Data Body Mass Index Body Surface Area 23.01 kg/m 2 1.94 m 2 Preferred Pharmacy Pharmacy Name Phone 305 [facility], [address] [place] [address] [address] [place] Your Updated Medication List  
  
   
This list is accurate as of: 8/1/17  9:59 AM.  Always use your most recent med list. amLODIPine 2.5 mg tablet Commonly known as:  Ganesh Sorensen Take 1 Tab by mouth daily. aspirin delayed-release 81 mg tablet Take 81 mg by mouth nightly. CALTRATE 600 + D 600 mg(1,500mg) -400 unit Chew Generic drug:  Calcium-Cholecalciferol (D3) Take  by mouth.  
  
 esomeprazole 40 mg capsule Commonly known as:  NexIUM Take 1 Cap by mouth daily. FISH OIL 1,000 mg Cap Generic drug:  omega-3 fatty acids-vitamin e Take 1 capsule by mouth daily. lisinopril 40 mg tablet Commonly known as:  Samaria Dowdy Take 1 tablet by mouth  daily Magnesium Oxide 500 mg Cap Take 500 mg by mouth daily. MULTIVITAMIN PO Take  by mouth. Takes one po daily. naproxen 500 mg tablet Commonly known as:  NAPROSYN Take 1 Tab by mouth two (2) times daily (with meals). OSTEO BI-FLEX PO Take  by mouth. Takes one po daily. REMIFEMIN MENOPAUSE PO Take  by mouth. Takes one po daily. Sometimes takes a second dose at night. simvastatin 20 mg tablet Commonly known as:  ZOCOR Take 1 tablet by mouth  nightly  
  
 triamcinolone acetonide 0.1 % topical cream  
Commonly known as:  KENALOG Apply  to affected area two (2) times a day. use thin layer TUMS PO Take 500 mg by mouth as needed. TYLENOL EXTRA STRENGTH 500 mg tablet Generic drug:  acetaminophen Take 500 mg by mouth nightly. VITAMIN D3 1,000 unit Cap Generic drug:  cholecalciferol Take 1,000 Units by mouth daily. Prescriptions Sent to Pharmacy Refills  
 lisinopril (PRINIVIL, ZESTRIL) 40 mg tablet 2 Sig: Take 1 tablet by mouth  daily  Class: Normal  
 Pharmacy: [facility] Ph #: [phone]  
 amLODIPine (NORVASC) 2.5 mg tablet 2 Sig: Take 1 Tab by mouth daily. Class: Normal  
 Pharmacy: [address]  #: [phone] Route: Oral  
  
Follow-up Instructions Return if symptoms worsen or fail to improve. Patient Instructions Labs today Decrease norvasc to 2.5mg daily Introducing [facility]! Dear Yoandy Schwab: 
Thank you for requesting a Directa Plus account. Our records indicate that you already have an active Directa Plus account. You can access your account anytime at [email or web address] Did you know that you can access your hospital and ER discharge instructions at any time in Directa Plus? You can also review all of your test results from your hospital stay or ER visit. Additional Information If you have questions, please visit the Frequently Asked Questions section of the Directa Plus website at [email or web address] Remember, Directa Plus is NOT to be used for urgent needs. For medical emergencies, dial 911. Now available from your iPhone and Android! Please provide this summary of care documentation to your next provider. If you have any questions after today's visit, please call [phone].

## 2017-08-01 NOTE — PROGRESS NOTES
HISTORY OF PRESENT ILLNESS  Zabrina Ma is a 73 y.o. female. HPI   Last here 3/21/17.  Pt is here to f/u on chronic conditions  Pt is not fasting today     BP today is on low side- 99/63  BP at home running up to 130, otherwise on lower side   Her BP was low last visit as well- 103/64  She has had some orthostatic sx at home   Continues norvasc 5mg and lisinopril 40mg daily   Discussed decreasing her norvasc from 5 to 2.5mg daily d/t lower BP and weight loss     Pt previously followed with Dr. Luis Angel Royal (cardio)   Last saw him 7/16, will only f/u prn at this time     Reviewed last labs 3/17  I ordered labs to complete prior today  She notes she called but they stated she could not complete these- discussed orders  Repeat labs today, pt is not fasting but on zocor      Wt is down 8 lbs since last visit  Her weight is now within normal ranges    Pt has been following with podiatry  She has some plantar fasciitis, cyst, and arthritis with this   She has gotten cortisone injection with improvement  Pt takes naprosyn prn for any pain with this     Continues zocor 20m daily for cholesterol- at goal last year     Reviewed mammogram 4/07/17: negative    Pt states she will be moving to [place]   She is moving next week- 8/17   Advised calling to establish with new PCP there    Continues nexium 40mg daily for reflux   She does have some breakthrough and takes TUMs for this occasionally  She is needing much less TUMs recently d/t her weight loss   Doing much better with this with dietary changes     PREVENTIVE:  Colonoscopy: 1/12/15, Dr. Cordell Pineda, repeat in 5 years  Pap: Dr. Hahn Certain, 1/15/16, every other year  Mammogram: 4/07/17 negative  DEXA: 2/16 mild osteopenia  Tdap: 8/10/2008  Pneumovax: 11/21/2013  Heoxvcg54: 11/10/2016  Zostavax: 12/17/2008  Flu shot: 9/16/2016  Eye exam: Dr. Larry Torrez, 5/29/15, she reports of repeat in 12/16  Lipids: 9/16 LDL 96, 7/17 ordered        Patient Active Problem List    Diagnosis Date Noted  Arterioloscleroses 07/12/2016    ACP (advance care planning) 03/15/2016    Sciatica 12/04/2015    Vitamin D deficiency 10/24/2014    GERD (gastroesophageal reflux disease) 05/14/2013    HTN (hypertension) 11/21/2011    Hyperlipidemia 11/21/2011     Current Outpatient Prescriptions   Medication Sig Dispense Refill    amLODIPine (NORVASC) 5 mg tablet Take 1 tablet by mouth  daily 90 Tab 1    simvastatin (ZOCOR) 20 mg tablet Take 1 tablet by mouth  nightly 90 Tab 2    lisinopril (PRINIVIL, ZESTRIL) 40 mg tablet Take 1 tablet by mouth  daily 90 Tab 1    naproxen (NAPROSYN) 500 mg tablet Take 1 Tab by mouth two (2) times daily (with meals). 180 Tab 2    Magnesium Oxide 500 mg cap Take 500 mg by mouth daily.  esomeprazole (NEXIUM) 40 mg capsule Take 1 Cap by mouth daily. 30 Cap 6    triamcinolone acetonide (KENALOG) 0.1 % topical cream Apply  to affected area two (2) times a day. use thin layer 30 g 2    acetaminophen (TYLENOL EXTRA STRENGTH) 500 mg tablet Take 500 mg by mouth nightly.  GLUCOSAMINE HCL/CHONDR CARDONA A NA (OSTEO BI-FLEX PO) Take  by mouth. Takes one po daily.  Cholecalciferol, Vitamin D3, (VITAMIN D3) 1,000 unit cap Take 1,000 Units by mouth daily.  BLACK COHOSH ROOT (REMIFEMIN MENOPAUSE PO) Take  by mouth. Takes one po daily. Sometimes takes a second dose at night.  CALCIUM CARBONATE (TUMS PO) Take 500 mg by mouth as needed.  omega-3 fatty acids-vitamin e (FISH OIL) 1,000 mg cap Take 1 capsule by mouth daily.  MULTIVITAMIN PO Take  by mouth. Takes one po daily.  Calcium-Cholecalciferol, D3, (CALTRATE 600 + D) 600 mg(1,500mg) -400 unit Chew Take  by mouth.  aspirin delayed-release 81 mg tablet Take 81 mg by mouth nightly. Past Surgical History:   Procedure Laterality Date    ENDOSCOPY, COLON, DIAGNOSTIC  12/2009    ([facility])-f/u 5 yrs.     HX BUNIONECTOMY  1998    left    HX CHOLECYSTECTOMY  3/10    HX GYN      not sure what was done    HX HEENT Left 1990    retinal tear repair    HX OTHER SURGICAL      EGD      Lab Results  Component Value Date/Time   WBC 4.1 03/17/2017 08:18 AM   HGB 12.3 03/17/2017 08:18 AM   HCT 38.3 03/17/2017 08:18 AM   PLATELET 675 74/78/3721 08:18 AM   MCV 91 03/17/2017 08:18 AM     Lab Results  Component Value Date/Time   Cholesterol, total 189 09/15/2016 08:33 AM   HDL Cholesterol 80 09/15/2016 08:33 AM   LDL, calculated 96 09/15/2016 08:33 AM   Triglyceride 63 09/15/2016 08:33 AM     Lab Results  Component Value Date/Time   GFR est non-AA 91 03/17/2017 08:18 AM   GFR est  03/17/2017 08:18 AM   Creatinine 0.64 03/17/2017 08:18 AM   BUN 12 03/17/2017 08:18 AM   Sodium 142 03/17/2017 08:18 AM   Potassium 4.0 03/17/2017 08:18 AM   Chloride 100 03/17/2017 08:18 AM   CO2 25 03/17/2017 08:18 AM          Review of Systems   Respiratory: Negative for shortness of breath. Cardiovascular: Negative for chest pain. Musculoskeletal: Positive for joint pain (foot). Physical Exam   Constitutional: She is oriented to person, place, and time. She appears well-developed and well-nourished. No distress. HENT:   Head: Normocephalic and atraumatic. Mouth/Throat: Oropharynx is clear and moist. No oropharyngeal exudate. Eyes: Conjunctivae and EOM are normal. Right eye exhibits no discharge. Left eye exhibits no discharge. Neck: Normal range of motion. Neck supple. Cardiovascular: Normal rate, regular rhythm, normal heart sounds and intact distal pulses. Exam reveals no gallop and no friction rub. No murmur heard. Pulmonary/Chest: Effort normal and breath sounds normal. No respiratory distress. She has no wheezes. She has no rales. She exhibits no tenderness. Musculoskeletal: Normal range of motion. She exhibits no edema, tenderness or deformity. Lymphadenopathy:     She has no cervical adenopathy. Neurological: She is alert and oriented to person, place, and time.  Coordination normal.   Skin: Skin is warm and dry. No rash noted. She is not diaphoretic. No erythema. No pallor. Varicose veins BLE   Psychiatric: She has a normal mood and affect. Her behavior is normal.       ASSESSMENT and PLAN    ICD-10-CM ICD-9-CM    1. Essential hypertension    BP overly well controlled, wt actually improved from last visit, decrease norvasc to 2.5mg daily, continue lisinopril 40mg daily. I10 401.9 LIPID PANEL      METABOLIC PANEL, COMPREHENSIVE      TSH 3RD GENERATION      VITAMIN D, 25 HYDROXY   2. Pure hypercholesterolemia    On zocor 20mg, check lipids today. Of note, she did eat this morning. E78.00 272.0 LIPID PANEL      METABOLIC PANEL, COMPREHENSIVE      TSH 3RD GENERATION      VITAMIN D, 25 HYDROXY   3. Gastroesophageal reflux disease without esophagitis    Improved, taking nexium once daily, transiently increased to BID dosing but now just once per day. K21.9 530.81 LIPID PANEL      METABOLIC PANEL, COMPREHENSIVE      TSH 3RD GENERATION      VITAMIN D, 25 HYDROXY   4. Routine general medical examination at a health care facility Z00.00 V70.0 LIPID PANEL      METABOLIC PANEL, COMPREHENSIVE      TSH 3RD GENERATION      VITAMIN D, 25 HYDROXY   5. Screening for alcoholism    Screen    Z13.89 V79.1 LIPID PANEL      METABOLIC PANEL, COMPREHENSIVE      TSH 3RD GENERATION      VITAMIN D, 25 HYDROXY   6. Breast screening    Screen    Z12.39 V76.10 LIPID PANEL      METABOLIC PANEL, COMPREHENSIVE      TSH 3RD GENERATION      VITAMIN D, 25 HYDROXY   7. Vitamin D deficiency    Check level and treat as needed. E55.9 268.9 LIPID PANEL      METABOLIC PANEL, COMPREHENSIVE      TSH 3RD GENERATION      VITAMIN D, 25 HYDROXY          Written by Justo Underwood, as dictated by Fabien Box MD.    Current diagnosis and concerns discussed with pt at length.  Understands risks and benefits or current treatment plan and medications and accepts the treatment and medication with any possible risks.   Pt asks appropriate questions which were answered.   Pt instructed to call with any concerns or problems. This note will not be viewable in [address]

## 2017-08-02 LAB
25(OH)D3+25(OH)D2 SERPL-MCNC: 55.1 NG/ML (ref 30–100)
ALBUMIN SERPL-MCNC: 4.4 G/DL (ref 3.6–4.8)
ALBUMIN/GLOB SERPL: 2.1 {RATIO} (ref 1.2–2.2)
ALP SERPL-CCNC: 63 IU/L (ref 39–117)
ALT SERPL-CCNC: 17 IU/L (ref 0–32)
AST SERPL-CCNC: 21 IU/L (ref 0–40)
BILIRUB SERPL-MCNC: 0.6 MG/DL (ref 0–1.2)
BUN SERPL-MCNC: 16 MG/DL (ref 8–27)
BUN/CREAT SERPL: 21 (ref 12–28)
CALCIUM SERPL-MCNC: 9.6 MG/DL (ref 8.7–10.3)
CHLORIDE SERPL-SCNC: 99 MMOL/L (ref 96–106)
CHOLEST SERPL-MCNC: 164 MG/DL (ref 100–199)
CO2 SERPL-SCNC: 29 MMOL/L (ref 18–29)
CREAT SERPL-MCNC: 0.75 MG/DL (ref 0.57–1)
GLOBULIN SER CALC-MCNC: 2.1 G/DL (ref 1.5–4.5)
GLUCOSE SERPL-MCNC: 85 MG/DL (ref 65–99)
HDLC SERPL-MCNC: 79 MG/DL
LDLC SERPL CALC-MCNC: 74 MG/DL (ref 0–99)
POTASSIUM SERPL-SCNC: 4.5 MMOL/L (ref 3.5–5.2)
PROT SERPL-MCNC: 6.5 G/DL (ref 6–8.5)
SODIUM SERPL-SCNC: 141 MMOL/L (ref 134–144)
TRIGL SERPL-MCNC: 55 MG/DL (ref 0–149)
TSH SERPL DL<=0.005 MIU/L-ACNC: 0.85 UIU/ML (ref 0.45–4.5)
VLDLC SERPL CALC-MCNC: 11 MG/DL (ref 5–40)